# Patient Record
Sex: MALE | Race: WHITE
[De-identification: names, ages, dates, MRNs, and addresses within clinical notes are randomized per-mention and may not be internally consistent; named-entity substitution may affect disease eponyms.]

---

## 2020-07-22 ENCOUNTER — HOSPITAL ENCOUNTER (OUTPATIENT)
Age: 75
End: 2020-07-22
Payer: MEDICARE

## 2020-07-22 DIAGNOSIS — R10.30: Primary | ICD-10-CM

## 2020-07-22 DIAGNOSIS — K57.90: ICD-10-CM

## 2020-07-22 DIAGNOSIS — J98.11: ICD-10-CM

## 2020-07-22 DIAGNOSIS — K86.89: ICD-10-CM

## 2020-07-22 DIAGNOSIS — K80.20: ICD-10-CM

## 2020-07-22 DIAGNOSIS — M47.816: ICD-10-CM

## 2020-07-22 PROCEDURE — 74176 CT ABD & PELVIS W/O CONTRAST: CPT

## 2020-07-22 NOTE — DI.CT.S_ITS
PROCEDURE:  CT ABDOMEN PELVIS WO CON  
   
INDICATIONS:  LOWER ABD PAIN  
   
TECHNIQUE:    
After the administration of oral contrast, 5 mm thick sections acquired from the   
diaphragms to the symphysis.  5 mm coronal and sagittal reformats were performed.  For   
radiation dose reduction, the following was used:  automated exposure control, adjustment   
of mA and/or kV according to patient size.    
   
COMPARISON:  [facility], CT, ABDOMEN/PELVIS WITHOUT CONTRAST, 10/06/2009, 8:18.  
   
FINDINGS:    
Image quality:  Excellent.    
   
ABDOMEN:    
Lung bases:  Bibasilar atelectasis.  Heart size is normal.    
   
Solid organs:  Liver is normal in size.  Gallbladder contains gallstones.  There is a 0.9   
x 1.7 cm calcification in the pancreatic body, unchanged.  Pancreas is normal in size.    
Spleen is normal in size.  No adrenal nodules.  Both kidneys are normal in size, without   
hydronephrosis or nephrolithiasis.    
   
Peritoneum and bowel:  There are scattered colonic diverticula.  No findings to suggest   
acute d calcified iverticulitis.  A large amount of stool in colon.  Bowel loops   
demonstrate normal wall thickness and caliber.  No free fluid or air.    
   
Nodes and vessels:  No retroperitoneal or mesenteric adenopathy by size criteria.  Aorta   
and inferior vena cava are normal in size.    
   
Miscellaneous:  No ventral hernias.    
   
   
PELVIS:    
Genitourinary:  Bladder wall thickness is normal.    
   
Miscellaneous:  No inguinal hernias or adenopathy.    
   
Bones:  No suspicious bony lesions.  No vertebral body compression fractures.  Severe   
degenerative changes in lumbar spine.  
   
IMPRESSION:    
   
1. Diverticulosis without acute diverticulitis.  
2. Stable calcification in pancreatic body.  
3. Cholelithiasis.  
4. A large amount of stool in colon.    
   
   
   
Dictated by: Valeria Xie M.D. on 7/22/2020 at 16:31       
Approved by: Valeria Xie M.D. on 7/22/2020 at 18:25

## 2022-10-05 ENCOUNTER — HOSPITAL ENCOUNTER (OUTPATIENT)
Age: 77
End: 2022-10-05
Payer: MEDICARE

## 2022-10-05 DIAGNOSIS — I82.432: Primary | ICD-10-CM

## 2022-10-05 DIAGNOSIS — I82.412: ICD-10-CM

## 2022-10-05 PROCEDURE — 93971 EXTREMITY STUDY: CPT

## 2023-04-03 ENCOUNTER — HOSPITAL ENCOUNTER (OUTPATIENT)
Age: 78
End: 2023-04-03
Payer: MEDICARE

## 2023-04-03 DIAGNOSIS — M25.562: Primary | ICD-10-CM

## 2023-04-03 PROCEDURE — 93970 EXTREMITY STUDY: CPT

## 2023-09-19 ENCOUNTER — HOSPITAL ENCOUNTER (OUTPATIENT)
Age: 78
End: 2023-09-19
Payer: MEDICARE

## 2023-09-19 DIAGNOSIS — I50.32: Primary | ICD-10-CM

## 2023-09-19 DIAGNOSIS — I47.29: ICD-10-CM

## 2023-09-19 LAB
ADD MANUAL DIFF / SLIDE REVIEW: NO
ALBUMIN SERPL-MCNC: 4.1 G/DL (ref 3.5–5)
ALBUMIN/GLOB SERPL: 1.4 {RATIO} (ref 1–2.8)
ALP SERPL-CCNC: 70 U/L (ref 38–126)
ALT SERPL-CCNC: 28 IU/L (ref ?–50)
BUN SERPL-MCNC: 17 MG/DL (ref 9–20)
CALCIUM SERPL-MCNC: 10.5 MG/DL (ref 8.4–10.2)
CHLORIDE SERPL-SCNC: 104 MMOL/L (ref 98–107)
CO2 SERPL-SCNC: 26 MMOL/L (ref 22–32)
ESTIMATED GLOMERULAR FILT RATE: > 60 ML/MIN (ref 60–?)
GLOBULIN SER CALC-MCNC: 2.9 G/DL (ref 1.7–4.1)
GLUCOSE SERPL-MCNC: 107 MG/DL (ref 80–110)
HEMATOCRIT: 36.6 % (ref 41–53)
HEMOGLOBIN: 12.9 G/DL (ref 13.5–17.5)
HEMOLYSIS: < 15 (ref 0–50)
LYMPHOCYTES # SPEC AUTO: 800 /UL (ref 1100–4500)
MCV RBC: 96.6 FL (ref 80–100)
MEAN CORPUSCULAR HEMOGLOBIN: 34.1 PG (ref 26–34)
MEAN CORPUSCULAR HGB CONC: 35.3 % (ref 30–36)
NT-PROBNP SERPL-MCNC: 237 PG/ML (ref ?–450)
PLATELET COUNT: 163 X10^3/UL (ref 150–400)
POTASSIUM SERPL-SCNC: 4.4 MMOL/L (ref 3.4–5.1)
PROT SERPL-MCNC: 7 G/DL (ref 6.3–8.2)
SODIUM SERPL-SCNC: 137 MMOL/L (ref 137–145)
TROPONIN I SERPL-MCNC: < 0.012 NG/ML (ref 0.01–0.03)
TSH SERPL DL<=0.05 MIU/L-ACNC: 2.39 UIU/ML (ref 0.47–4.68)

## 2023-09-19 PROCEDURE — 71046 X-RAY EXAM CHEST 2 VIEWS: CPT

## 2023-09-19 PROCEDURE — 84443 ASSAY THYROID STIM HORMONE: CPT

## 2023-09-19 PROCEDURE — 36415 COLL VENOUS BLD VENIPUNCTURE: CPT

## 2023-09-19 PROCEDURE — 83880 ASSAY OF NATRIURETIC PEPTIDE: CPT

## 2023-09-19 PROCEDURE — 80053 COMPREHEN METABOLIC PANEL: CPT

## 2023-09-19 PROCEDURE — 84484 ASSAY OF TROPONIN QUANT: CPT

## 2023-09-19 PROCEDURE — 85025 COMPLETE CBC W/AUTO DIFF WBC: CPT
